# Patient Record
Sex: MALE | Race: ASIAN
[De-identification: names, ages, dates, MRNs, and addresses within clinical notes are randomized per-mention and may not be internally consistent; named-entity substitution may affect disease eponyms.]

---

## 2020-03-04 ENCOUNTER — HOSPITAL ENCOUNTER (OUTPATIENT)
Dept: HOSPITAL 95 - PLD | Age: 60
Discharge: HOME | End: 2020-03-04
Attending: DERMATOLOGY
Payer: COMMERCIAL

## 2020-03-04 DIAGNOSIS — L82.0: Primary | ICD-10-CM

## 2020-10-27 ENCOUNTER — HOSPITAL ENCOUNTER (OUTPATIENT)
Dept: HOSPITAL 95 - US | Age: 60
End: 2020-10-27
Attending: INTERNAL MEDICINE
Payer: COMMERCIAL

## 2020-10-27 DIAGNOSIS — Z86.010: ICD-10-CM

## 2020-10-27 DIAGNOSIS — B18.1: Primary | ICD-10-CM

## 2020-10-27 DIAGNOSIS — L40.9: ICD-10-CM

## 2020-10-27 DIAGNOSIS — E78.5: ICD-10-CM

## 2020-10-27 DIAGNOSIS — K21.9: ICD-10-CM

## 2020-10-27 DIAGNOSIS — Z79.899: ICD-10-CM

## 2020-10-27 NOTE — NUR
Dressing to procedure site clean, dry, intact with no visible
drainage, swelling, erythema or bruising noted.M PT LYING ON RIGHT SIDE WITH
TOWEL ROLL. PROVIDED FLUYIDS AND WARM BLANKETS.

## 2020-10-27 NOTE — NUR
Patient up to Ambulate independently. Gait steady.
Discharge instructions reviewed with patient. Patient verbalizes understanding.
Copy given to patient to take home.
Dressing to procedure site clean, dry, intact with no visible
drainage, swelling, erythema or bruising noted. ALL BELONINGS RETURNED TO
PATIENT.

## 2020-11-16 ENCOUNTER — HOSPITAL ENCOUNTER (OUTPATIENT)
Dept: HOSPITAL 95 - LAB SHORT | Age: 60
Discharge: HOME | End: 2020-11-16
Attending: PHYSICIAN ASSISTANT
Payer: COMMERCIAL

## 2020-11-16 DIAGNOSIS — U07.1: Primary | ICD-10-CM

## 2020-11-16 PROCEDURE — U0003 INFECTIOUS AGENT DETECTION BY NUCLEIC ACID (DNA OR RNA); SEVERE ACUTE RESPIRATORY SYNDROME CORONAVIRUS 2 (SARS-COV-2) (CORONAVIRUS DISEASE [COVID-19]), AMPLIFIED PROBE TECHNIQUE, MAKING USE OF HIGH THROUGHPUT TECHNOLOGIES AS DESCRIBED BY CMS-2020-01-R: HCPCS
